# Patient Record
Sex: FEMALE | Race: BLACK OR AFRICAN AMERICAN
[De-identification: names, ages, dates, MRNs, and addresses within clinical notes are randomized per-mention and may not be internally consistent; named-entity substitution may affect disease eponyms.]

---

## 2018-08-29 ENCOUNTER — HOSPITAL ENCOUNTER (EMERGENCY)
Dept: HOSPITAL 92 - ERS | Age: 73
Discharge: HOME | End: 2018-08-29
Payer: MEDICARE

## 2018-08-29 DIAGNOSIS — I10: ICD-10-CM

## 2018-08-29 DIAGNOSIS — R07.89: Primary | ICD-10-CM

## 2018-08-29 DIAGNOSIS — R31.9: ICD-10-CM

## 2018-08-29 DIAGNOSIS — Z79.899: ICD-10-CM

## 2018-08-29 DIAGNOSIS — Z79.82: ICD-10-CM

## 2018-08-29 DIAGNOSIS — E78.5: ICD-10-CM

## 2018-08-29 LAB
ALBUMIN SERPL BCG-MCNC: 4.1 G/DL (ref 3.4–4.8)
ALP SERPL-CCNC: 84 U/L (ref 40–150)
ALT SERPL W P-5'-P-CCNC: 30 U/L (ref 8–55)
ANION GAP SERPL CALC-SCNC: 11 MMOL/L (ref 10–20)
AST SERPL-CCNC: 44 U/L (ref 5–34)
BASOPHILS # BLD AUTO: 0 THOU/UL (ref 0–0.2)
BASOPHILS NFR BLD AUTO: 0.3 % (ref 0–1)
BILIRUB SERPL-MCNC: 1.1 MG/DL (ref 0.2–1.2)
BUN SERPL-MCNC: 9 MG/DL (ref 9.8–20.1)
CALCIUM SERPL-MCNC: 9.3 MG/DL (ref 7.8–10.44)
CHLORIDE SERPL-SCNC: 107 MMOL/L (ref 98–107)
CK MB SERPL-MCNC: 1.4 NG/ML (ref 0–6.6)
CK SERPL-CCNC: 603 U/L (ref 29–168)
CO2 SERPL-SCNC: 25 MMOL/L (ref 23–31)
CREAT CL PREDICTED SERPL C-G-VRATE: 0 ML/MIN (ref 70–130)
CRYSTAL-AUWI FLAG: 0 (ref 0–15)
EOSINOPHIL # BLD AUTO: 0.1 THOU/UL (ref 0–0.7)
EOSINOPHIL NFR BLD AUTO: 1.6 % (ref 0–10)
GLOBULIN SER CALC-MCNC: 3.8 G/DL (ref 2.4–3.5)
GLUCOSE SERPL-MCNC: 84 MG/DL (ref 83–110)
HEV IGM SER QL: 0.7 (ref 0–7.99)
HGB BLD-MCNC: 12 G/DL (ref 12–16)
HYALINE CASTS #/AREA URNS LPF: (no result) LPF
LYMPHOCYTES # BLD: 1.9 THOU/UL (ref 1.2–3.4)
LYMPHOCYTES NFR BLD AUTO: 30.9 % (ref 21–51)
MCH RBC QN AUTO: 31.3 PG (ref 27–31)
MCV RBC AUTO: 90.8 FL (ref 78–98)
MONOCYTES # BLD AUTO: 0.3 THOU/UL (ref 0.11–0.59)
MONOCYTES NFR BLD AUTO: 5.5 % (ref 0–10)
NEUTROPHILS # BLD AUTO: 3.8 THOU/UL (ref 1.4–6.5)
NEUTROPHILS NFR BLD AUTO: 61.6 % (ref 42–75)
PATHC CAST-AUWI FLAG: 0.14 (ref 0–2.49)
PLATELET # BLD AUTO: 260 THOU/UL (ref 130–400)
POTASSIUM SERPL-SCNC: 4 MMOL/L (ref 3.5–5.1)
RBC # BLD AUTO: 3.83 MILL/UL (ref 4.2–5.4)
RBC UR QL AUTO: (no result) HPF (ref 0–3)
SODIUM SERPL-SCNC: 139 MMOL/L (ref 136–145)
SP GR UR STRIP: 1.01 (ref 1–1.04)
SPERM-AUWI FLAG: 0 (ref 0–9.9)
TROPONIN I SERPL DL<=0.01 NG/ML-MCNC: (no result) NG/ML (ref ?–0.03)
WBC # BLD AUTO: 6.2 THOU/UL (ref 4.8–10.8)
WBC UR QL AUTO: (no result) HPF (ref 0–3)
YEAST-AUWI FLAG: 0 (ref 0–25)

## 2018-08-29 PROCEDURE — 93005 ELECTROCARDIOGRAM TRACING: CPT

## 2018-08-29 PROCEDURE — 83605 ASSAY OF LACTIC ACID: CPT

## 2018-08-29 PROCEDURE — 81003 URINALYSIS AUTO W/O SCOPE: CPT

## 2018-08-29 PROCEDURE — 84484 ASSAY OF TROPONIN QUANT: CPT

## 2018-08-29 PROCEDURE — 82550 ASSAY OF CK (CPK): CPT

## 2018-08-29 PROCEDURE — 81015 MICROSCOPIC EXAM OF URINE: CPT

## 2018-08-29 PROCEDURE — 71045 X-RAY EXAM CHEST 1 VIEW: CPT

## 2018-08-29 PROCEDURE — 96374 THER/PROPH/DIAG INJ IV PUSH: CPT

## 2018-08-29 PROCEDURE — 94760 N-INVAS EAR/PLS OXIMETRY 1: CPT

## 2018-08-29 PROCEDURE — 36415 COLL VENOUS BLD VENIPUNCTURE: CPT

## 2018-08-29 PROCEDURE — 96361 HYDRATE IV INFUSION ADD-ON: CPT

## 2018-08-29 PROCEDURE — 80053 COMPREHEN METABOLIC PANEL: CPT

## 2018-08-29 PROCEDURE — 85025 COMPLETE CBC W/AUTO DIFF WBC: CPT

## 2018-08-29 PROCEDURE — 82553 CREATINE MB FRACTION: CPT

## 2018-08-29 PROCEDURE — 83690 ASSAY OF LIPASE: CPT

## 2018-08-29 PROCEDURE — 74176 CT ABD & PELVIS W/O CONTRAST: CPT

## 2018-08-29 NOTE — RAD
PORTABLE CHEST:

 

History: Shortness of breath. 

 

Comparison: 6-14-17

 

FINDINGS: 

Heart size is within normal limits. There are post op sternotomy changes with a pacemaker. The lungs 
are clear of infiltrative process. No signs of failure. 

 

IMPRESSION: 

No active intrathoracic disease. 

 

POS: [facility]

## 2018-08-29 NOTE — CT
CT ABDOMEN AND PELVIS WITHOUT CONTRAST:

8/29/18

 

HISTORY: 

Left flank pain.

 

COMPARISON:  

CT abdomen and pelvis with contrast 2011. 

 

FINDINGS:  

The lung bases are clear. No pericardial effusion. 

 

No nephroureterolithiasis or hydroureteronephrosis. No secondary evidence of recently passed stone. 

 

Prior cholecystectomy. Noncontrast evaluation of the spleen. Pancreas is unremarkable.

 

Small retroperitoneal periaortic lymph nodes. No dilated loops of large or small bowel. 

 

Moderate facet arthropathy or the lower lumbar spine. 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
hysis. 

 

IMPRESSION:  

1.      No nephroureterolithiasis or hydroureteronephrosis. No secondary evidence of recently passed 
stone. 

2.      Normal appendix.

3.      No acute inflammatory process in the abdomen or pelvis. 

 

POS: [facility]

## 2018-08-30 NOTE — EKG
Test Reason : 

Blood Pressure : ***/*** mmHG

Vent. Rate : 060 BPM     Atrial Rate : 060 BPM

   P-R Int : 194 ms          QRS Dur : 096 ms

    QT Int : 428 ms       P-R-T Axes : 058 -11 -88 degrees

   QTc Int : 428 ms

 

Atrial-paced rhythm

Left ventricular hypertrophy with repolarization abnormality

Cannot rule out Septal infarct , age undetermined

Abnormal ECG

 

Confirmed by NASIR LOJA M.D. ([id number]),  VICKY FAROOQ ([id number]) on 8/30/2018 1:41:57 PM

 

Referred By:             Confirmed By:NASIR LOJA M.D.

## 2019-08-28 ENCOUNTER — HOSPITAL ENCOUNTER (EMERGENCY)
Dept: HOSPITAL 92 - ERS | Age: 74
Discharge: HOME | End: 2019-08-28
Payer: MEDICARE

## 2019-08-28 DIAGNOSIS — R51: ICD-10-CM

## 2019-08-28 DIAGNOSIS — M25.512: ICD-10-CM

## 2019-08-28 DIAGNOSIS — Z79.899: ICD-10-CM

## 2019-08-28 DIAGNOSIS — M25.562: Primary | ICD-10-CM

## 2019-08-28 DIAGNOSIS — I10: ICD-10-CM
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
## 2019-08-28 DIAGNOSIS — Z79.82: ICD-10-CM

## 2019-08-28 LAB
ALBUMIN SERPL BCG-MCNC: 4.3 G/DL (ref 3.4–4.8)
ALP SERPL-CCNC: 75 U/L (ref 40–150)
ALT SERPL W P-5'-P-CCNC: 19 U/L (ref 8–55)
ANION GAP SERPL CALC-SCNC: 12 MMOL/L (ref 10–20)
AST SERPL-CCNC: 28 U/L (ref 5–34)
BASOPHILS # BLD AUTO: 0 THOU/UL (ref 0–0.2)
BASOPHILS NFR BLD AUTO: 0.6 % (ref 0–1)
BILIRUB SERPL-MCNC: 0.6 MG/DL (ref 0.2–1.2)
BUN SERPL-MCNC: 12 MG/DL (ref 9.8–20.1)
CALCIUM SERPL-MCNC: 9.3 MG/DL (ref 7.8–10.44)
CHLORIDE SERPL-SCNC: 106 MMOL/L (ref 98–107)
CO2 SERPL-SCNC: 25 MMOL/L (ref 23–31)
CREAT CL PREDICTED SERPL C-G-VRATE: 0 ML/MIN (ref 70–130)
EOSINOPHIL # BLD AUTO: 0.1 THOU/UL (ref 0–0.7)
EOSINOPHIL NFR BLD AUTO: 2.4 % (ref 0–10)
GLOBULIN SER CALC-MCNC: 3.5 G/DL (ref 2.4–3.5)
GLUCOSE SERPL-MCNC: 62 MG/DL (ref 83–110)
HGB BLD-MCNC: 12.6 G/DL (ref 12–16)
LYMPHOCYTES # BLD: 1.9 THOU/UL (ref 1.2–3.4)
LYMPHOCYTES NFR BLD AUTO: 39.3 % (ref 21–51)
MCH RBC QN AUTO: 31.3 PG (ref 27–31)
MCV RBC AUTO: 93.7 FL (ref 78–98)
MONOCYTES # BLD AUTO: 0.3 THOU/UL (ref 0.11–0.59)
MONOCYTES NFR BLD AUTO: 5.6 % (ref 0–10)
NEUTROPHILS # BLD AUTO: 2.6 THOU/UL (ref 1.4–6.5)
NEUTROPHILS NFR BLD AUTO: 52.1 % (ref 42–75)
PLATELET # BLD AUTO: 254 THOU/UL (ref 130–400)
POTASSIUM SERPL-SCNC: 4 MMOL/L (ref 3.5–5.1)
RBC # BLD AUTO: 4.03 MILL/UL (ref 4.2–5.4)
SODIUM SERPL-SCNC: 139 MMOL/L (ref 136–145)
WBC # BLD AUTO: 4.9 THOU/UL (ref 4.8–10.8)

## 2019-08-28 PROCEDURE — 85025 COMPLETE CBC W/AUTO DIFF WBC: CPT

## 2019-08-28 PROCEDURE — 84484 ASSAY OF TROPONIN QUANT: CPT

## 2019-08-28 PROCEDURE — 80053 COMPREHEN METABOLIC PANEL: CPT

## 2019-08-28 PROCEDURE — 71045 X-RAY EXAM CHEST 1 VIEW: CPT

## 2019-08-28 PROCEDURE — 36415 COLL VENOUS BLD VENIPUNCTURE: CPT

## 2019-08-28 PROCEDURE — 70450 CT HEAD/BRAIN W/O DYE: CPT

## 2019-08-28 PROCEDURE — 93005 ELECTROCARDIOGRAM TRACING: CPT

## 2019-08-28 NOTE — RAD
LEFT KNEE 4 VIEWS:

 

Date:  08/28/19 

 

HISTORY:  

Left knee pain, fall. 

 

FINDINGS:

There are postop changes of total knee arthroplasty in good position and alignment. No acute fracture
 or dislocation is seen. No perihardware lucency is identified to suggest loosening. 

 

IMPRESSION: 

No acute process. 

 

 

 

POS: [facility]

## 2019-08-28 NOTE — CT
CT HEAD NONCONTRAST:

8/28/19

 

HISTORY: 

Fall. Head injury. 

 

FINDINGS: 

There is no evidence of acute intracranial hemorrhage or infarct. Mild chronic ischemic small vessel 
disease. There is no mass effect or shift of midline structures. The visualized paranasal sinuses rem
ain well aerated.

 

IMPRESSION: 

No acute intracranial abnormalities are demonstrated. 

 

POS: [facility]

## 2019-08-28 NOTE — RAD
PORTABLE CHEST 1 VIEW:

 

Date:  08/28/19 

Time:  1351 hours

 

HISTORY:  

Dizziness. 

 

FINDINGS:

 

Comparison made with exam of 08/29/18. 

 

There are changes of median sternotomy. The heart size is normal. The aorta is tortuous. Left-sided p
acemaker device remains in place. The lungs are well expanded without focal areas of consolidation, p
neumothoraces, or pleural effusions. 

 

IMPRESSION: 

No acute process. 

 

 

 

POS: [facility]

## 2019-08-28 NOTE — RAD
LEFT SHOULDER 3 VIEWS:

 

HISTORY: 

Shoulder pain after fall.

 

FINDINGS: 

Some moderate arthritic changes of the AC joint.  Glenohumeral joint shows minimal arthritic change. 
 There is no fracture or dislocation.

 

IMPRESSION: 

No evidence of fracture.

 

POS: [facility]

## 2022-01-02 ENCOUNTER — HOSPITAL ENCOUNTER (EMERGENCY)
Dept: HOSPITAL 92 - CSHERS | Age: 77
Discharge: HOME | End: 2022-01-02
Payer: MEDICARE

## 2022-01-02 DIAGNOSIS — Z20.822: ICD-10-CM

## 2022-01-02 DIAGNOSIS — E78.5: ICD-10-CM

## 2022-01-02 DIAGNOSIS — I10: ICD-10-CM

## 2022-01-02 DIAGNOSIS — E78.00: ICD-10-CM

## 2022-01-02 DIAGNOSIS — B34.9: Primary | ICD-10-CM

## 2022-01-02 LAB
ALBUMIN SERPL BCG-MCNC: 3.8 G/DL (ref 3.4–4.8)
ALP SERPL-CCNC: 88 U/L (ref 40–110)
ALT SERPL W P-5'-P-CCNC: 42 U/L (ref 8–55)
ANION GAP SERPL CALC-SCNC: 18 MMOL/L (ref 10–20)
AST SERPL-CCNC: 69 U/L (ref 5–34)
BACTERIA UR QL AUTO: (no result) HPF
BASOPHILS # BLD AUTO: 0 10X3/UL (ref 0–0.2)
BASOPHILS NFR BLD AUTO: 0.3 % (ref 0–2)
BILIRUB SERPL-MCNC: 0.8 MG/DL (ref 0.2–1.2)
BUN SERPL-MCNC: 8 MG/DL (ref 9.8–20.1)
CALCIUM SERPL-MCNC: 8.8 MG/DL (ref 7.8–10.44)
CHLORIDE SERPL-SCNC: 104 MMOL/L (ref 98–107)
CO2 SERPL-SCNC: 23 MMOL/L (ref 23–31)
CREAT CL PREDICTED SERPL C-G-VRATE: 0 ML/MIN (ref 70–130)
EOSINOPHIL # BLD AUTO: 0.2 10X3/UL (ref 0–0.5)
EOSINOPHIL NFR BLD AUTO: 4.1 % (ref 0–6)
GLOBULIN SER CALC-MCNC: 3.4 G/DL (ref 2.4–3.5)
GLUCOSE SERPL-MCNC: 72 MG/DL (ref 83–110)
HGB BLD-MCNC: 11.4 G/DL (ref 12–15.5)
LIPASE SERPL-CCNC: 16 U/L (ref 8–78)
LYMPHOCYTES NFR BLD AUTO: 27.7 % (ref 18–47)
MCH RBC QN AUTO: 30 PG (ref 27–33)
MCV RBC AUTO: 95 FL (ref 81.6–98.3)
MONOCYTES # BLD AUTO: 0.4 10X3/UL (ref 0–1.1)
MONOCYTES NFR BLD AUTO: 7.1 % (ref 0–10)
NEUTROPHILS # BLD AUTO: 3.6 10X3/UL (ref 1.5–8.4)
NEUTROPHILS NFR BLD AUTO: 60.6 % (ref 40–75)
PLATELET # BLD AUTO: 220 10X3/UL (ref 150–450)
POTASSIUM SERPL-SCNC: 4 MMOL/L (ref 3.5–5.1)
RBC # BLD AUTO: 3.8 10X6/UL (ref 3.9–5.03)
RBC UR QL AUTO: (no result) HPF (ref 0–3)
SODIUM SERPL-SCNC: 141 MMOL/L (ref 136–145)
SP GR UR STRIP: 1.01 (ref 1–1.04)
WBC # BLD AUTO: 5.9 10X3/UL (ref 3.5–10.5)
WBC UR QL AUTO: (no result) HPF (ref 0–3)

## 2022-01-02 PROCEDURE — 84484 ASSAY OF TROPONIN QUANT: CPT

## 2022-01-02 PROCEDURE — 83880 ASSAY OF NATRIURETIC PEPTIDE: CPT

## 2022-01-02 PROCEDURE — U0005 INFEC AGEN DETEC AMPLI PROBE: HCPCS

## 2022-01-02 PROCEDURE — 36416 COLLJ CAPILLARY BLOOD SPEC: CPT

## 2022-01-02 PROCEDURE — U0003 INFECTIOUS AGENT DETECTION BY NUCLEIC ACID (DNA OR RNA); SEVERE ACUTE RESPIRATORY SYNDROME CORONAVIRUS 2 (SARS-COV-2) (CORONAVIRUS DISEASE [COVID-19]), AMPLIFIED PROBE TECHNIQUE, MAKING USE OF HIGH THROUGHPUT TECHNOLOGIES AS DESCRIBED BY CMS-2020-01-R: HCPCS

## 2022-01-02 PROCEDURE — 71045 X-RAY EXAM CHEST 1 VIEW: CPT

## 2022-01-02 PROCEDURE — 81015 MICROSCOPIC EXAM OF URINE: CPT

## 2022-01-02 PROCEDURE — 85025 COMPLETE CBC W/AUTO DIFF WBC: CPT

## 2022-01-02 PROCEDURE — 82962 GLUCOSE BLOOD TEST: CPT

## 2022-01-02 PROCEDURE — 80053 COMPREHEN METABOLIC PANEL: CPT

## 2022-01-02 PROCEDURE — 96374 THER/PROPH/DIAG INJ IV PUSH: CPT

## 2022-01-02 PROCEDURE — 81003 URINALYSIS AUTO W/O SCOPE: CPT

## 2022-01-02 PROCEDURE — 99285 EMERGENCY DEPT VISIT HI MDM: CPT

## 2022-01-02 PROCEDURE — 83690 ASSAY OF LIPASE: CPT

## 2022-04-14 ENCOUNTER — HOSPITAL ENCOUNTER (EMERGENCY)
Dept: HOSPITAL 92 - CSHERS | Age: 77
Discharge: HOME | End: 2022-04-14
Payer: MEDICARE

## 2022-04-14 DIAGNOSIS — E78.5: ICD-10-CM

## 2022-04-14 DIAGNOSIS — I49.9: ICD-10-CM

## 2022-04-14 DIAGNOSIS — Z95.0: ICD-10-CM

## 2022-04-14 DIAGNOSIS — R07.9: Primary | ICD-10-CM

## 2022-04-14 DIAGNOSIS — I10: ICD-10-CM

## 2022-04-14 DIAGNOSIS — E78.00: ICD-10-CM

## 2022-04-14 DIAGNOSIS — I44.7: ICD-10-CM

## 2022-04-14 LAB
ALBUMIN SERPL BCG-MCNC: 3.9 G/DL (ref 3.4–4.8)
ALP SERPL-CCNC: 55 U/L (ref 40–110)
ALT SERPL W P-5'-P-CCNC: 12 U/L (ref 8–55)
ANION GAP SERPL CALC-SCNC: 11 MMOL/L (ref 10–20)
AST SERPL-CCNC: 19 U/L (ref 5–34)
BASOPHILS # BLD AUTO: 0 10X3/UL (ref 0–0.2)
BASOPHILS NFR BLD AUTO: 0.5 % (ref 0–2)
BILIRUB SERPL-MCNC: 0.6 MG/DL (ref 0.2–1.2)
BUN SERPL-MCNC: 15 MG/DL (ref 9.8–20.1)
CALCIUM SERPL-MCNC: 8.9 MG/DL (ref 7.8–10.44)
CHLORIDE SERPL-SCNC: 107 MMOL/L (ref 98–107)
CO2 SERPL-SCNC: 27 MMOL/L (ref 23–31)
CREAT CL PREDICTED SERPL C-G-VRATE: 0 ML/MIN (ref 70–130)
EOSINOPHIL # BLD AUTO: 0.2 10X3/UL (ref 0–0.5)
EOSINOPHIL NFR BLD AUTO: 4 % (ref 0–6)
GLOBULIN SER CALC-MCNC: 3 G/DL (ref 2.4–3.5)
GLUCOSE SERPL-MCNC: 98 MG/DL (ref 83–110)
HGB BLD-MCNC: 11.1 G/DL (ref 12–15.5)
LIPASE SERPL-CCNC: 31 U/L (ref 8–78)
LYMPHOCYTES NFR BLD AUTO: 40.8 % (ref 18–47)
MCH RBC QN AUTO: 29.8 PG (ref 27–33)
MCV RBC AUTO: 91.1 FL (ref 81.6–98.3)
MONOCYTES # BLD AUTO: 0.3 10X3/UL (ref 0–1.1)
MONOCYTES NFR BLD AUTO: 7.3 % (ref 0–10)
NEUTROPHILS # BLD AUTO: 2 10X3/UL (ref 1.5–8.4)
NEUTROPHILS NFR BLD AUTO: 47.2 % (ref 40–75)
PLATELET # BLD AUTO: 247 10X3/UL (ref 150–450)
POTASSIUM SERPL-SCNC: 3.8 MMOL/L (ref 3.5–5.1)
RBC # BLD AUTO: 3.72 10X6/UL (ref 3.9–5.03)
SODIUM SERPL-SCNC: 141 MMOL/L (ref 136–145)
WBC # BLD AUTO: 4.3 10X3/UL (ref 3.5–10.5)

## 2022-04-14 PROCEDURE — 93005 ELECTROCARDIOGRAM TRACING: CPT

## 2022-04-14 PROCEDURE — 84484 ASSAY OF TROPONIN QUANT: CPT

## 2022-04-14 PROCEDURE — 85025 COMPLETE CBC W/AUTO DIFF WBC: CPT

## 2022-04-14 PROCEDURE — 83690 ASSAY OF LIPASE: CPT

## 2022-04-14 PROCEDURE — 94760 N-INVAS EAR/PLS OXIMETRY 1: CPT

## 2022-04-14 PROCEDURE — 80053 COMPREHEN METABOLIC PANEL: CPT

## 2022-04-14 PROCEDURE — 71045 X-RAY EXAM CHEST 1 VIEW: CPT

## 2023-03-07 ENCOUNTER — HOSPITAL ENCOUNTER (EMERGENCY)
Dept: HOSPITAL 92 - CSHERS | Age: 78
Discharge: HOME | End: 2023-03-07
Payer: MEDICARE

## 2023-03-07 DIAGNOSIS — E78.5: ICD-10-CM

## 2023-03-07 DIAGNOSIS — I10: ICD-10-CM

## 2023-03-07 DIAGNOSIS — I25.10: ICD-10-CM

## 2023-03-07 DIAGNOSIS — Z95.1: ICD-10-CM

## 2023-03-07 DIAGNOSIS — U07.1: Primary | ICD-10-CM

## 2023-03-07 LAB
ALBUMIN SERPL BCG-MCNC: 3.8 G/DL (ref 3.4–4.8)
ALP SERPL-CCNC: 62 U/L (ref 40–110)
ALT SERPL W P-5'-P-CCNC: 12 U/L (ref 8–55)
ANION GAP SERPL CALC-SCNC: 14 MMOL/L (ref 10–20)
AST SERPL-CCNC: 21 U/L (ref 5–34)
BASOPHILS # BLD AUTO: 0 10X3/UL (ref 0–0.2)
BASOPHILS NFR BLD AUTO: 0.2 % (ref 0–2)
BILIRUB SERPL-MCNC: 0.9 MG/DL (ref 0.2–1.2)
BUN SERPL-MCNC: 11 MG/DL (ref 9.8–20.1)
CALCIUM SERPL-MCNC: 9 MG/DL (ref 7.8–10.44)
CHLORIDE SERPL-SCNC: 107 MMOL/L (ref 98–107)
CO2 SERPL-SCNC: 23 MMOL/L (ref 23–31)
CREAT CL PREDICTED SERPL C-G-VRATE: 0 ML/MIN (ref 70–130)
EOSINOPHIL # BLD AUTO: 0.1 10X3/UL (ref 0–0.5)
EOSINOPHIL NFR BLD AUTO: 1.4 % (ref 0–6)
GLOBULIN SER CALC-MCNC: 3.4 G/DL (ref 2.4–3.5)
GLUCOSE SERPL-MCNC: 101 MG/DL (ref 83–110)
HGB BLD-MCNC: 10.6 G/DL (ref 12–15.5)
LIPASE SERPL-CCNC: 27 U/L (ref 8–78)
LYMPHOCYTES NFR BLD AUTO: 26.2 % (ref 18–47)
MCH RBC QN AUTO: 30 PG (ref 27–33)
MCV RBC AUTO: 92.1 FL (ref 81.6–98.3)
MONOCYTES # BLD AUTO: 0.3 10X3/UL (ref 0–1.1)
MONOCYTES NFR BLD AUTO: 7.5 % (ref 0–10)
NEUTROPHILS # BLD AUTO: 2.9 10X3/UL (ref 1.5–8.4)
NEUTROPHILS NFR BLD AUTO: 64.5 % (ref 40–75)
PLATELET # BLD AUTO: 221 10X3/UL (ref 150–450)
POTASSIUM SERPL-SCNC: 3.2 MMOL/L (ref 3.5–5.1)
RBC # BLD AUTO: 3.53 10X6/UL (ref 3.9–5.03)
SARS-COV-2 RNA RESP QL NAA+PROBE: DETECTED
SODIUM SERPL-SCNC: 141 MMOL/L (ref 136–145)
TROPONIN I SERPL DL<=0.01 NG/ML-MCNC: 0.02 NG/ML (ref ?–0.03)
WBC # BLD AUTO: 4.4 10X3/UL (ref 3.5–10.5)

## 2023-03-07 PROCEDURE — 36415 COLL VENOUS BLD VENIPUNCTURE: CPT

## 2023-03-07 PROCEDURE — 80053 COMPREHEN METABOLIC PANEL: CPT

## 2023-03-07 PROCEDURE — 93005 ELECTROCARDIOGRAM TRACING: CPT

## 2023-03-07 PROCEDURE — 84484 ASSAY OF TROPONIN QUANT: CPT

## 2023-03-07 PROCEDURE — 0240U: CPT

## 2023-03-07 PROCEDURE — 83880 ASSAY OF NATRIURETIC PEPTIDE: CPT

## 2023-03-07 PROCEDURE — 83690 ASSAY OF LIPASE: CPT

## 2023-03-07 PROCEDURE — 85025 COMPLETE CBC W/AUTO DIFF WBC: CPT

## 2023-03-07 PROCEDURE — 71045 X-RAY EXAM CHEST 1 VIEW: CPT

## 2024-03-06 ENCOUNTER — HOSPITAL ENCOUNTER (EMERGENCY)
Dept: HOSPITAL 92 - CSHERS | Age: 79
Discharge: HOME | End: 2024-03-06
Payer: MEDICARE

## 2024-03-06 DIAGNOSIS — S16.1XXA: Primary | ICD-10-CM
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
## 2024-03-06 DIAGNOSIS — I10: ICD-10-CM

## 2024-03-06 LAB
ALBUMIN SERPL BCG-MCNC: 3.8 G/DL (ref 3.4–4.8)
ALP SERPL-CCNC: 59 U/L (ref 40–110)
ALT SERPL W P-5'-P-CCNC: 12 U/L (ref 8–55)
ANION GAP SERPL CALC-SCNC: 15 MMOL/L (ref 10–20)
AST SERPL-CCNC: 22 U/L (ref 5–34)
BASOPHILS # BLD AUTO: 0 10X3/UL (ref 0–0.2)
BASOPHILS NFR BLD AUTO: 0.5 % (ref 0–2)
BILIRUB SERPL-MCNC: 0.9 MG/DL (ref 0.2–1.2)
BUN SERPL-MCNC: 7 MG/DL (ref 9.8–20.1)
CALCIUM SERPL-MCNC: 8.8 MG/DL (ref 7.8–10.44)
CHLORIDE SERPL-SCNC: 108 MMOL/L (ref 98–107)
CO2 SERPL-SCNC: 22 MMOL/L (ref 23–31)
CREAT CL PREDICTED SERPL C-G-VRATE: 0 ML/MIN (ref 70–130)
EOSINOPHIL # BLD AUTO: 0.1 10X3/UL (ref 0–0.5)
EOSINOPHIL NFR BLD AUTO: 1.8 % (ref 0–6)
GLOBULIN SER CALC-MCNC: 3 G/DL (ref 2.4–3.5)
GLUCOSE SERPL-MCNC: 79 MG/DL (ref 83–110)
HCT VFR BLD CALC: 32.3 % (ref 34.9–44.5)
HGB BLD-MCNC: 10.6 G/DL (ref 12–15.5)
LYMPHOCYTES NFR BLD AUTO: 31.6 % (ref 18–47)
MCH RBC QN AUTO: 32.2 PG (ref 27–33)
MCV RBC AUTO: 98.2 FL (ref 81.6–98.3)
MONOCYTES # BLD AUTO: 0.5 10X3/UL (ref 0–1.1)
MONOCYTES NFR BLD AUTO: 8.1 % (ref 0–10)
NEUTROPHILS # BLD AUTO: 3.3 10X3/UL (ref 1.5–8.4)
NEUTROPHILS NFR BLD AUTO: 57.8 % (ref 40–75)
PLATELET # BLD AUTO: 207 10X3/UL (ref 150–450)
POTASSIUM SERPL-SCNC: 4.2 MMOL/L (ref 3.5–5.1)
RBC # BLD AUTO: 3.29 10X6/UL (ref 3.9–5.03)
SODIUM SERPL-SCNC: 141 MMOL/L (ref 136–145)
TROPONIN I SERPL DL<=0.01 NG/ML-MCNC: 0.01 NG/ML (ref ?–0.03)
WBC # BLD AUTO: 5.7 10X3/UL (ref 3.5–10.5)

## 2024-03-06 PROCEDURE — 93005 ELECTROCARDIOGRAM TRACING: CPT

## 2024-03-06 PROCEDURE — 80053 COMPREHEN METABOLIC PANEL: CPT

## 2024-03-06 PROCEDURE — 84484 ASSAY OF TROPONIN QUANT: CPT

## 2024-03-06 PROCEDURE — 71045 X-RAY EXAM CHEST 1 VIEW: CPT

## 2024-03-06 PROCEDURE — 96374 THER/PROPH/DIAG INJ IV PUSH: CPT

## 2024-03-06 PROCEDURE — 85025 COMPLETE CBC W/AUTO DIFF WBC: CPT

## 2024-07-24 ENCOUNTER — HOSPITAL ENCOUNTER (OUTPATIENT)
Dept: HOSPITAL 92 - CSHERS | Age: 79
Setting detail: OBSERVATION
LOS: 1 days | Discharge: HOME | End: 2024-07-25
Admitting: INTERNAL MEDICINE
Payer: MEDICARE

## 2024-07-24 VITALS — BODY MASS INDEX: 29.2 KG/M2

## 2024-07-24 DIAGNOSIS — N18.30: ICD-10-CM

## 2024-07-24 DIAGNOSIS — I13.10: ICD-10-CM

## 2024-07-24 DIAGNOSIS — R07.2: Primary | ICD-10-CM

## 2024-07-24 DIAGNOSIS — I16.0: ICD-10-CM

## 2024-07-24 DIAGNOSIS — Z95.5: ICD-10-CM

## 2024-07-24 DIAGNOSIS — E78.5: ICD-10-CM

## 2024-07-24 DIAGNOSIS — Z88.5: ICD-10-CM

## 2024-07-24 DIAGNOSIS — Z79.899: ICD-10-CM

## 2024-07-24 DIAGNOSIS — F32.9: ICD-10-CM

## 2024-07-24 DIAGNOSIS — G89.29: ICD-10-CM

## 2024-07-24 DIAGNOSIS — D64.9: ICD-10-CM

## 2024-07-24 DIAGNOSIS — Z79.82: ICD-10-CM

## 2024-07-24 DIAGNOSIS — F32.0: ICD-10-CM

## 2024-07-24 DIAGNOSIS — Z95.0: ICD-10-CM

## 2024-07-24 DIAGNOSIS — I49.5: ICD-10-CM

## 2024-07-24 DIAGNOSIS — I25.10: ICD-10-CM

## 2024-07-24 LAB
ALBUMIN SERPL BCG-MCNC: 3.7 G/DL (ref 3.4–4.8)
ALP SERPL-CCNC: 54 U/L (ref 40–110)
ALT SERPL W P-5'-P-CCNC: (no result) U/L (ref 8–55)
ANION GAP SERPL CALC-SCNC: 13 MMOL/L (ref 10–20)
APTT PPP: 24.6 SEC (ref 22–33)
AST SERPL-CCNC: 17 U/L (ref 5–34)
BASOPHILS # BLD AUTO: 0.03 10X3/UL (ref 0–0.2)
BASOPHILS NFR BLD AUTO: 0.6 % (ref 0–2)
BILIRUB SERPL-MCNC: 0.7 MG/DL (ref 0.2–1.2)
BUN SERPL-MCNC: 10 MG/DL (ref 9.8–20.1)
CALCIUM SERPL-MCNC: 9.8 MG/DL (ref 7.8–10.44)
CHLORIDE SERPL-SCNC: 104 MMOL/L (ref 98–107)
CO2 SERPL-SCNC: 27 MMOL/L (ref 23–31)
CREAT CL PREDICTED SERPL C-G-VRATE: 0 ML/MIN (ref 70–130)
EOSINOPHIL # BLD AUTO: 0.23 10X3/UL (ref 0–0.5)
EOSINOPHIL NFR BLD AUTO: 4.7 % (ref 0–6)
GLOBULIN SER CALC-MCNC: 3.7 G/DL (ref 2.4–3.5)
GLUCOSE SERPL-MCNC: 74 MG/DL (ref 83–110)
HCT VFR BLD CALC: 30.8 % (ref 34.9–44.5)
HGB BLD-MCNC: 10.4 G/DL (ref 12–15.5)
INR PPP: 1
LIPASE SERPL-CCNC: 37 U/L (ref 8–78)
LYMPHOCYTES NFR BLD AUTO: 35.6 % (ref 18–47)
MCH RBC QN AUTO: 30.9 PG (ref 27–33)
MCV RBC AUTO: 91.4 FL (ref 81.6–98.3)
MONOCYTES # BLD AUTO: 0.31 10X3/UL (ref 0–1.1)
MONOCYTES NFR BLD AUTO: 6.3 % (ref 0–10)
NEUTROPHILS # BLD AUTO: 2.58 10X3/UL (ref 1.5–8.4)
NEUTROPHILS NFR BLD AUTO: 52.6 % (ref 40–75)
PLATELET # BLD AUTO: 262 10X3/UL (ref 150–450)
POTASSIUM SERPL-SCNC: 3.9 MMOL/L (ref 3.5–5.1)
PROTHROMBIN TIME: 10.7 SEC (ref 9.5–12.1)
RBC # BLD AUTO: 3.37 10X6/UL (ref 3.9–5.03)
SODIUM SERPL-SCNC: 140 MMOL/L (ref 136–145)
TROPONIN I SERPL DL<=0.01 NG/ML-MCNC: (no result) NG/ML (ref ?–0.03)
TROPONIN I SERPL DL<=0.01 NG/ML-MCNC: (no result) NG/ML (ref ?–0.03)
TROPONIN I SERPL DL<=0.01 NG/ML-MCNC: 0.01 NG/ML (ref ?–0.03)
WBC # BLD AUTO: 4.9 10X3/UL (ref 3.5–10.5)

## 2024-07-24 PROCEDURE — 96374 THER/PROPH/DIAG INJ IV PUSH: CPT

## 2024-07-24 PROCEDURE — 71045 X-RAY EXAM CHEST 1 VIEW: CPT

## 2024-07-24 PROCEDURE — 83690 ASSAY OF LIPASE: CPT

## 2024-07-24 PROCEDURE — 36415 COLL VENOUS BLD VENIPUNCTURE: CPT

## 2024-07-24 PROCEDURE — 85610 PROTHROMBIN TIME: CPT

## 2024-07-24 PROCEDURE — 94760 N-INVAS EAR/PLS OXIMETRY 1: CPT

## 2024-07-24 PROCEDURE — 85730 THROMBOPLASTIN TIME PARTIAL: CPT

## 2024-07-24 PROCEDURE — 84484 ASSAY OF TROPONIN QUANT: CPT

## 2024-07-24 PROCEDURE — 83880 ASSAY OF NATRIURETIC PEPTIDE: CPT

## 2024-07-24 PROCEDURE — 83735 ASSAY OF MAGNESIUM: CPT

## 2024-07-24 PROCEDURE — 99285 EMERGENCY DEPT VISIT HI MDM: CPT

## 2024-07-24 PROCEDURE — 85025 COMPLETE CBC W/AUTO DIFF WBC: CPT

## 2024-07-24 PROCEDURE — 84443 ASSAY THYROID STIM HORMONE: CPT

## 2024-07-24 PROCEDURE — 80053 COMPREHEN METABOLIC PANEL: CPT

## 2024-07-24 PROCEDURE — 93005 ELECTROCARDIOGRAM TRACING: CPT

## 2024-07-24 PROCEDURE — 96372 THER/PROPH/DIAG INJ SC/IM: CPT

## 2024-07-24 PROCEDURE — 80048 BASIC METABOLIC PNL TOTAL CA: CPT

## 2024-07-24 PROCEDURE — G0378 HOSPITAL OBSERVATION PER HR: HCPCS

## 2024-07-25 VITALS — SYSTOLIC BLOOD PRESSURE: 192 MMHG | DIASTOLIC BLOOD PRESSURE: 101 MMHG | TEMPERATURE: 98.5 F

## 2024-07-25 LAB
ANION GAP SERPL CALC-SCNC: 16 MMOL/L (ref 10–20)
BUN SERPL-MCNC: 11 MG/DL (ref 9.8–20.1)
CALCIUM SERPL-MCNC: 9.2 MG/DL (ref 7.8–10.44)
CHLORIDE SERPL-SCNC: 106 MMOL/L (ref 98–107)
CO2 SERPL-SCNC: 23 MMOL/L (ref 23–31)
CREAT CL PREDICTED SERPL C-G-VRATE: 56 ML/MIN (ref 70–130)
GLUCOSE SERPL-MCNC: 97 MG/DL (ref 83–110)
MAGNESIUM SERPL-MCNC: 2 MG/DL (ref 1.6–2.6)
POTASSIUM SERPL-SCNC: 3.8 MMOL/L (ref 3.5–5.1)
SODIUM SERPL-SCNC: 141 MMOL/L (ref 136–145)

## 2024-07-25 RX ADMIN — ASPIRIN 81 MG CHEWABLE TABLET SCH MG: 81 TABLET CHEWABLE at 09:39

## 2024-07-25 RX ADMIN — ENOXAPARIN SODIUM SCH MG: 100 INJECTION SUBCUTANEOUS at 09:39

## 2024-08-23 ENCOUNTER — HOSPITAL ENCOUNTER (EMERGENCY)
Dept: HOSPITAL 92 - CSHERS | Age: 79
Discharge: HOME | End: 2024-08-23
Payer: MEDICARE

## 2024-08-23 DIAGNOSIS — Z95.0: ICD-10-CM

## 2024-08-23 DIAGNOSIS — R29.700: ICD-10-CM

## 2024-08-23 DIAGNOSIS — F03.90: ICD-10-CM

## 2024-08-23 DIAGNOSIS — J06.9: ICD-10-CM

## 2024-08-23 DIAGNOSIS — R51.9: ICD-10-CM

## 2024-08-23 DIAGNOSIS — I10: Primary | ICD-10-CM

## 2024-08-23 LAB
ALBUMIN SERPL BCG-MCNC: 3.7 G/DL (ref 3.4–4.8)
ALP SERPL-CCNC: 53 U/L (ref 40–110)
ALT SERPL W P-5'-P-CCNC: (no result) U/L (ref 8–55)
ANION GAP SERPL CALC-SCNC: 13 MMOL/L (ref 10–20)
AST SERPL-CCNC: 17 U/L (ref 5–34)
BACTERIA UR QL AUTO: (no result) HPF
BASOPHILS # BLD AUTO: 0.02 10X3/UL (ref 0–0.2)
BASOPHILS NFR BLD AUTO: 0.5 % (ref 0–2)
BILIRUB SERPL-MCNC: 0.4 MG/DL (ref 0.2–1.2)
BUN SERPL-MCNC: 18 MG/DL (ref 9.8–20.1)
CALCIUM SERPL-MCNC: 9.7 MG/DL (ref 7.8–10.44)
CAUTI INDICATIONS FOR CULTURE: (no result)
CHLORIDE SERPL-SCNC: 106 MMOL/L (ref 98–107)
CO2 SERPL-SCNC: 25 MMOL/L (ref 23–31)
CREAT CL PREDICTED SERPL C-G-VRATE: 0 ML/MIN (ref 70–130)
EOSINOPHIL # BLD AUTO: 0.27 10X3/UL (ref 0–0.5)
EOSINOPHIL NFR BLD AUTO: 6.3 % (ref 0–6)
GLOBULIN SER CALC-MCNC: 4.1 G/DL (ref 2.4–3.5)
GLUCOSE SERPL-MCNC: 97 MG/DL (ref 83–110)
HCT VFR BLD CALC: 32.1 % (ref 34.9–44.5)
HGB BLD-MCNC: 10.4 G/DL (ref 12–15.5)
LYMPHOCYTES NFR BLD AUTO: 50.8 % (ref 18–47)
MCH RBC QN AUTO: 30.1 PG (ref 27–33)
MCV RBC AUTO: 93 FL (ref 81.6–98.3)
MONOCYTES # BLD AUTO: 0.36 10X3/UL (ref 0–1.1)
MONOCYTES NFR BLD AUTO: 8.4 % (ref 0–10)
NEUTROPHILS # BLD AUTO: 1.44 10X3/UL (ref 1.5–8.4)
NEUTROPHILS NFR BLD AUTO: 33.5 % (ref 40–75)
PLATELET # BLD AUTO: 274 10X3/UL (ref 150–450)
POTASSIUM SERPL-SCNC: 4.3 MMOL/L (ref 3.5–5.1)
PROT UR STRIP.AUTO-MCNC: 30 MG/DL
RBC # BLD AUTO: 3.45 10X6/UL (ref 3.9–5.03)
RBC UR QL AUTO: (no result) HPF (ref 0–3)
SODIUM SERPL-SCNC: 140 MMOL/L (ref 136–145)
SP GR UR STRIP: 1.02 (ref 1–1.03)
TROPONIN I SERPL DL<=0.01 NG/ML-MCNC: 0.01 NG/ML (ref ?–0.03)
WBC # BLD AUTO: 4.3 10X3/UL (ref 3.5–10.5)
WBC UR QL AUTO: (no result) HPF (ref 0–3)

## 2024-08-23 PROCEDURE — 36415 COLL VENOUS BLD VENIPUNCTURE: CPT

## 2024-08-23 PROCEDURE — 71045 X-RAY EXAM CHEST 1 VIEW: CPT

## 2024-08-23 PROCEDURE — 99284 EMERGENCY DEPT VISIT MOD MDM: CPT

## 2024-08-23 PROCEDURE — 80053 COMPREHEN METABOLIC PANEL: CPT

## 2024-08-23 PROCEDURE — 84484 ASSAY OF TROPONIN QUANT: CPT

## 2024-08-23 PROCEDURE — 85025 COMPLETE CBC W/AUTO DIFF WBC: CPT

## 2024-08-23 PROCEDURE — 70450 CT HEAD/BRAIN W/O DYE: CPT

## 2024-08-23 PROCEDURE — 93005 ELECTROCARDIOGRAM TRACING: CPT

## 2024-08-23 PROCEDURE — 81001 URINALYSIS AUTO W/SCOPE: CPT

## 2024-08-23 PROCEDURE — 0240U: CPT
